# Patient Record
Sex: MALE | Race: BLACK OR AFRICAN AMERICAN | HISPANIC OR LATINO
[De-identification: names, ages, dates, MRNs, and addresses within clinical notes are randomized per-mention and may not be internally consistent; named-entity substitution may affect disease eponyms.]

---

## 2022-10-05 ENCOUNTER — APPOINTMENT (OUTPATIENT)
Dept: ORTHOPEDIC SURGERY | Facility: CLINIC | Age: 32
End: 2022-10-05
Payer: COMMERCIAL

## 2022-10-05 ENCOUNTER — NON-APPOINTMENT (OUTPATIENT)
Age: 32
End: 2022-10-05

## 2022-10-05 VITALS — BODY MASS INDEX: 37.24 KG/M2 | WEIGHT: 266 LBS | HEIGHT: 71 IN

## 2022-10-05 DIAGNOSIS — M54.2 CERVICALGIA: ICD-10-CM

## 2022-10-05 DIAGNOSIS — M25.512 PAIN IN LEFT SHOULDER: ICD-10-CM

## 2022-10-05 PROBLEM — Z00.00 ENCOUNTER FOR PREVENTIVE HEALTH EXAMINATION: Status: ACTIVE | Noted: 2022-10-05

## 2022-10-05 PROCEDURE — 20553 NJX 1/MLT TRIGGER POINTS 3/>: CPT

## 2022-10-05 PROCEDURE — 73030 X-RAY EXAM OF SHOULDER: CPT | Mod: LT

## 2022-10-05 PROCEDURE — 99203 OFFICE O/P NEW LOW 30 MIN: CPT | Mod: 25

## 2022-10-05 NOTE — HISTORY OF PRESENT ILLNESS
[de-identified] : LEFT SHOULDER PAIN- LHD \par STARTED YEARS AGO\par FLARED UP 8 MONTHS AGO\par INTERMITTENT\par WORSE AT NIGHT, WRITING\par LIMITED ROM \par RADIATES UP NECK \par PAIN LEVEL: 6-9/10\par THROBBING, SHARP\par PT WENT TO [facility] \par BETTER WITH STRETCHING, AVIL, TYLENOL, RESTING\par

## 2022-10-05 NOTE — PHYSICAL EXAM
[de-identified] : PHYSICAL EXAM LEFT  SHOULDER\par \par NORMAL POSTURE / SCAPULAR PROTRACTION\par AROM 140 / 140 / 90 / 30\par TENDER: SA REGION / AC JOINT / GH JOINT / BICEPS GROOVE\par \par SPECIAL TESTING :\par MORALES - POSITIVE \par JEAN-PAUL - POSITIVE \par SPEED TEST - POSITIVE\par \par BRYSON - NEGATIVE \par APPREHENSION AND SUPPRESSION - NEGATIVE \par \par RC STRENGTH TESTING \par SS:  5/5\par SUB 5/5\par IS     5/5\par BICEPS  5/5\par \par SENSATION  - GROSSLY INTACT\par \par \par  [de-identified] : LEFT SHOULDER XRAY (2 VIEWS - AP AND OUTLET) -  \par NO OBVIOUS FRACTURE , SEPARATION OR DISLOCATION \par NO SIGNIFICANT OSTEOARTHRITIS, \par TYPE 2B ACROMION \par CSA=35.5\par

## 2022-10-05 NOTE — DISCUSSION/SUMMARY
[de-identified] : PATIENT HAS ELECTED TO PROCEED WITH TRIGGER POINT INJECTIONS SHOULDER \par RISKS AND BENEFITS DISCUSSED - VERBAL CONSENT OBTAINED \par \par \par POST INJECTION INSTRUCTIONS\par \par COLD THERAPY , ANALGESICS PRN\par \par HOME STRETCHING AND EXERCISES QD  - TRIGGER POINTS - neck and shoulder HANDOUT PROVIDED, REVIEWED AND DEMONSTRATED \par \par REPEAT INJECTION 2-3 WEEKS \par \par CONSIDER  P.T.  2 WEEKS AFTER INJECTION - 2 X 4 WEEKS \par \par MRI IF NO RELIEF - 12 weeks\par

## 2022-10-05 NOTE — PROCEDURE
[de-identified] : TRIGGER POINT INJECTIONS\par \par Patient has demonstrated limited relief from NSAIDS, rest, exercises / PT, and after discussion of the risks and benefits, the patient elected to proceed with a trigger point injection into the \par LEFT LEVATOR and RHOMBOID x2 \par \par \par  I confirmed no prior adverse reactions, no active infections, and no relevant allergies. \par The skin was prepped in the usual sterile manner. The site was injected with local anesthetic followed by local needling. \par The injection was completed without complication and a bandage was applied.\par  \par The patient tolerated the procedure well and was given post-injection instructions. Cold Tx x 48 hours, analgesics. prn \par Medications: 1.5cc of 1% xylocaine + 1.5cc .25% Bupivicaine + KENALOG 1MG  per site\par

## 2022-10-26 ENCOUNTER — APPOINTMENT (OUTPATIENT)
Dept: ORTHOPEDIC SURGERY | Facility: CLINIC | Age: 32
End: 2022-10-26